# Patient Record
Sex: FEMALE | Race: WHITE | URBAN - METROPOLITAN AREA
[De-identification: names, ages, dates, MRNs, and addresses within clinical notes are randomized per-mention and may not be internally consistent; named-entity substitution may affect disease eponyms.]

---

## 2017-10-09 ENCOUNTER — TRANSFERRED RECORDS (OUTPATIENT)
Dept: HEALTH INFORMATION MANAGEMENT | Facility: CLINIC | Age: 37
End: 2017-10-09

## 2017-10-16 ENCOUNTER — MEDICAL CORRESPONDENCE (OUTPATIENT)
Dept: HEALTH INFORMATION MANAGEMENT | Facility: CLINIC | Age: 37
End: 2017-10-16

## 2017-11-07 ENCOUNTER — MEDICAL CORRESPONDENCE (OUTPATIENT)
Dept: HEALTH INFORMATION MANAGEMENT | Facility: CLINIC | Age: 37
End: 2017-11-07

## 2018-03-20 ENCOUNTER — TELEPHONE (OUTPATIENT)
Dept: DERMATOLOGY | Facility: CLINIC | Age: 38
End: 2018-03-20

## 2018-03-20 NOTE — TELEPHONE ENCOUNTER
Called Irma to remind her of her appointment on 3/27/18. Informed them of parking and to arrive 15 minutes early.   Kim Wilson MA

## 2018-03-27 ENCOUNTER — OFFICE VISIT (OUTPATIENT)
Dept: DERMATOLOGY | Facility: CLINIC | Age: 38
End: 2018-03-27
Payer: COMMERCIAL

## 2018-03-27 VITALS — DIASTOLIC BLOOD PRESSURE: 75 MMHG | HEART RATE: 65 BPM | SYSTOLIC BLOOD PRESSURE: 116 MMHG

## 2018-03-27 DIAGNOSIS — L65.9 LOSS OF HAIR: ICD-10-CM

## 2018-03-27 DIAGNOSIS — Z51.81 MEDICATION MONITORING ENCOUNTER: ICD-10-CM

## 2018-03-27 DIAGNOSIS — L65.9 LOSS OF HAIR: Primary | ICD-10-CM

## 2018-03-27 DIAGNOSIS — L21.9 DERMATITIS, SEBORRHEIC: ICD-10-CM

## 2018-03-27 LAB
BASOPHILS # BLD AUTO: 0 10E9/L (ref 0–0.2)
BASOPHILS NFR BLD AUTO: 0.6 %
DIFFERENTIAL METHOD BLD: NORMAL
EOSINOPHIL # BLD AUTO: 0.1 10E9/L (ref 0–0.7)
EOSINOPHIL NFR BLD AUTO: 2.7 %
ERYTHROCYTE [DISTWIDTH] IN BLOOD BY AUTOMATED COUNT: 12.4 % (ref 10–15)
FERRITIN SERPL-MCNC: 18 NG/ML (ref 12–150)
HCT VFR BLD AUTO: 37.5 % (ref 35–47)
HGB BLD-MCNC: 12.5 G/DL (ref 11.7–15.7)
IMM GRANULOCYTES # BLD: 0 10E9/L (ref 0–0.4)
IMM GRANULOCYTES NFR BLD: 0.2 %
IRON SATN MFR SERPL: 26 % (ref 15–46)
IRON SERPL-MCNC: 83 UG/DL (ref 35–180)
LYMPHOCYTES # BLD AUTO: 2 10E9/L (ref 0.8–5.3)
LYMPHOCYTES NFR BLD AUTO: 39.8 %
MCH RBC QN AUTO: 32.5 PG (ref 26.5–33)
MCHC RBC AUTO-ENTMCNC: 33.3 G/DL (ref 31.5–36.5)
MCV RBC AUTO: 97 FL (ref 78–100)
MONOCYTES # BLD AUTO: 0.4 10E9/L (ref 0–1.3)
MONOCYTES NFR BLD AUTO: 7.5 %
NEUTROPHILS # BLD AUTO: 2.5 10E9/L (ref 1.6–8.3)
NEUTROPHILS NFR BLD AUTO: 49.2 %
NRBC # BLD AUTO: 0 10*3/UL
NRBC BLD AUTO-RTO: 0 /100
PLATELET # BLD AUTO: 203 10E9/L (ref 150–450)
POTASSIUM SERPL-SCNC: 4 MMOL/L (ref 3.4–5.3)
RBC # BLD AUTO: 3.85 10E12/L (ref 3.8–5.2)
TIBC SERPL-MCNC: 317 UG/DL (ref 240–430)
TSH SERPL DL<=0.005 MIU/L-ACNC: 1.77 MU/L (ref 0.4–4)
WBC # BLD AUTO: 5.1 10E9/L (ref 4–11)

## 2018-03-27 RX ORDER — FLUOCINOLONE ACETONIDE 0.11 MG/ML
OIL TOPICAL
Qty: 1 BOTTLE | Refills: 3 | Status: SHIPPED | OUTPATIENT
Start: 2018-03-27

## 2018-03-27 RX ORDER — SPIRONOLACTONE 50 MG/1
TABLET, FILM COATED ORAL
COMMUNITY
Start: 2017-04-21

## 2018-03-27 RX ORDER — KETOCONAZOLE 20 MG/ML
SHAMPOO TOPICAL
Qty: 120 ML | Refills: 11 | Status: SHIPPED | OUTPATIENT
Start: 2018-03-27

## 2018-03-27 NOTE — PROGRESS NOTES
[facility] Dermatology Note      Dermatology Problem List:  1.Nonscarring alopecia - Clinically consistent with female pattern hairloss  -Labs 3/27  -LLLT discussed 3/27    Encounter Date: Mar 27, 2018    CC:  Chief Complaint   Patient presents with     Hair Loss     Irma is visiting for a hair loss consultation.         History of Present Illness:  Ms. Irma Benedict is a 37 year old female who presents in consultation from Anne DORAN at [facility]. Irma reports that she first noticed hair loss 11 years ago during pregnancy and after her first child was born. She reports hair loss with both of her pregnancies (has 2 kids), but otherwise seems to come and go without any identifiable triggers. After her second child was born and the hair loss continued to worsen, she went to [facility] approximately 2.5 years ago at which time she was prescribed rogaine foam and spironolactone. Spironolactone was started at 150mg, then increased to 200mg, then decreased again to 150mg due to gynecomastia. Irma reports that rogaine and spironolactone initially helped, but 7 months after starting noticed increased hair loss again. Hair loss has fluctuated over the last 3 years. Pt reports that right now her hair is the best it has been for some time. She reports occasional scalp pruritus which she feels is due to the dryness from rogaine, but denies scalp pain, burning, or tingling. Brows, lashes, and body hair have been stable.     She shampoos with beauty counter shampoo and conditioner every other day. Also uses a beauty counter hair gel. Has never had any irritation with any products she has used on her scalp.     She reports a history of irregular periods with prolonged periods of bleeding, sometimes will bleed for 3 weeks out of the month. She has had laser hair removal in the past for upper lip and lower abdomen hair. She has never taken hormones to her knowledge and is not currently on  any contraceptive as she has had a tubal ligation.     Labs were done at initial workup showing elevated DHEA-S and testosterone, which have since normalized. Ferritin was found to be 8, which has slightly increased on recheck 10/17 (was 16), but remains low on oral supplementation. A biopsy was done in November 2017 that showed mild nonspecific perivascular and peribulbar inflammation and a telogen shift of 25-30%. Was felt to be consistent with AGA given clinical context.     Past Medical History:   There is no problem list on file for this patient.    History reviewed. No pertinent past medical history.  History reviewed. No pertinent surgical history.    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     Family History:  History of balding in grandfather, no other hair loss in family. Mother has had numerous skin cancers.     Medications:  Current Outpatient Prescriptions   Medication Sig Dispense Refill     spironolactone (ALDACTONE) 50 MG tablet 2 every am and 2 every pm       No Known Allergies      Review of Systems:  -As per HPI  -Skin: As above in HPI. No additional skin concerns.    Physical exam:  Vitals: /75 (BP Location: Left arm, Patient Position: Chair, Cuff Size: Adult Regular)  Pulse 65  GEN: This is a well developed, well-nourished female in no acute distress, in a pleasant mood.    SKIN: Focused examination of the scalp, face, hands, arms was performed.  -Diffuse mild thinning of scalp hair with mild tony tree pattern frontally  -Anterior part width 1.1 to 1.2  -Mild adherent scale and diffuse follicular accentuation, no erythema  -Regrowth layers are robust:    1st: 1-2cm   2nd: 4-5cm   3rd: 8-10cm   4th: 10-12cm  -Robust layer of 2-3cm fibers coming in along frontal hairline   -Brows and lashes WNL  -Fingernails without pitting or ridging  -No other lesions of concern on areas examined.      Impression/Plan:  1. 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 . Discussed rechecking labs given hormonal as well as ferritin abnormalities in the past. If pt is unable to increase ferritin levels with oral supplementation may consider referral to hematology for discussion of iron infusions. Recommend continuing rogaine foam and adding ketoconazole shampoo for perifollicular accentuation and scale. Also recommend starting dermasmoothe oil 1x/w for scalp dryness. LLLT discussed as an option to consider in the future as well.     Labs today: iron studies, ferritin, CBC with diff, DHEA-S, testosterone, vitamin D, zinc, TSH, potassium     Will follow up with lab results and further recommendations, in the meantime continue spironolactone 100mg QD and rogaine 5% foam QD    Start ketoconazole 2% shampoo QOD    LLLT discussed, handout provided     Photographs taken for future reference       Follow-up in 4 months, earlier for new or changing lesions.     Staff Involved:  Scribed by Sarah Nuñez, MS4 for Dr. Headley.      cc: Anne DORAN      I agree with the PFSH and ROS as completed by the Medical Student. The remainder of the encounter was performed by me and scribed by the Medical Student. The scribed note accurately reflects my personal services and the medical decisions made by me.      Oliva Headley MD  Professor and Chair  Department of Dermatology  [facility]

## 2018-03-27 NOTE — NURSING NOTE
Dermatology Rooming Note    Irma Benedict's goals for this visit include:   Chief Complaint   Patient presents with     Hair Loss     Irma is visiting for a hair loss consultation.     Hailey Yip LPN

## 2018-03-27 NOTE — MR AVS SNAPSHOT
After Visit Summary   3/27/2018    Irma Benedict    MRN: [id number]           Patient Information     Date Of Birth          1980        Visit Information        Provider Department      3/27/2018 2:30 PM Oliva Headley MD [facility]        Today's Diagnoses     Loss of hair    -  1    Dermatitis, seborrheic        Medication monitoring encounter           Follow-ups after your visit        Your next 10 appointments already scheduled     Mar 27, 2018  4:00 PM CDT   LAB with  LAB   [facility] ([facility])    [address] [place] [id number] [phone]           Please do not eat 10-12 hours before your appointment if you are coming in fasting for labs on lipids, cholesterol, or glucose (sugar). This does not apply to pregnant women. Water, hot tea and black coffee (with nothing added) are okay. Do not drink other fluids, diet soda or chew gum.              Future tests that were ordered for you today     Open Future Orders        Priority Expected Expires Ordered    Potassium Routine  3/27/2019 3/27/2018    CBC with platelets differential Routine  3/27/2019 3/27/2018    Zinc Routine  3/27/2019 3/27/2018    Testosterone Free and Total Routine  3/27/2019 3/27/2018    DHEA sulfate Routine  3/27/2019 3/27/2018    Ferritin Routine  3/27/2019 3/27/2018    Iron and iron binding capacity Routine  3/27/2019 3/27/2018    Vitamin D Deficiency Routine  3/27/2019 3/27/2018    TSH with free T4 reflex Routine  3/27/2019 3/27/2018            Who to contact     Please call your clinic at [phone] to:    Ask questions about your health    Make or cancel appointments    Discuss your medicines    Learn about your test results    Speak to your doctor            Additional Information About Your Visit        MyChart Information     [employer] is an electronic gateway that provides easy, online access to your medical records. With  MyChart, you can request a clinic appointment, read your test results, renew a prescription or communicate with your care team.     To sign up for [employer] visit the website at [email or web address]   You will be asked to enter the access code listed below, as well as some personal information. Please follow the directions to create your username and password.     Your access code is: [email or web address]  Expires: 2018  3:34 PM     Your access code will  in 90 days. If you need help or a new code, please contact your [facility] or call [phone] for assistance.        Care EveryWhere ID     This is your Care EveryWhere ID. This could be used by other organizations to access your [facility] medical records  [id number]        Your Vitals Were     Pulse                   65            Blood Pressure from Last 3 Encounters:   18 116/75    Weight from Last 3 Encounters:   No data found for Wt                 Today's Medication Changes          These changes are accurate as of 3/27/18  3:34 PM.  If you have any questions, ask your nurse or doctor.               Start taking these medicines.        Dose/Directions    Fluocinolone Acetonide Scalp 0.01 % Oil oil   Commonly known as:  DERMA-SMOOTHE/FS SCALP   Used for:  Dermatitis, seborrheic   Started by:  Oliva Headley MD        Use once weekly. Massage into scalp and leave in overnight, shampoo out in the morning   Quantity:  1 Bottle   Refills:  3       ketoconazole 2 % shampoo   Commonly known as:  NIZORAL   Used for:  Dermatitis, seborrheic   Started by:  Oliva Headley MD        Use 2-3 times per week. Massage into wet hair, leave in 2-3 minutes, then rinse out   Quantity:  120 mL   Refills:  11            Where to get your medicines      These medications were sent to [facility]/pharm[facility] #[id number] - [place] - [address]  [address] [place] [address]     Phone:  [phone]      Fluocinolone Acetonide Scalp 0.01 % Oil oil    ketoconazole 2 % shampoo                Primary Care Provider    None Specified       No primary provider on file.        Equal Access to Services     OVI FERRARO : Hadii aad ku hadmelissaabimael Mckenzie, alinthom alycaronha, grace joannaaliya baldwin, edwin pinedajoselyn vic. So [facility] [phone].    ATENCIÓN: Si habla español, tiene a little disposición servicios gratuitos de asistencia lingüística. Llame al [phone].    We comply with applicable federal civil rights laws and [place] laws. We do not discriminate on the basis of race, color, national origin, age, disability, sex, sexual orientation, or gender identity.            Thank you!     Thank you for choosing [facility]  for your care. Our goal is always to provide you with excellent care. Hearing back from our patients is one way we can continue to improve our services. Please take a few minutes to complete the written survey that you may receive in the mail after your visit with us. Thank you!             Your Updated Medication List - Protect others around you: Learn how to safely use, store and throw away your medicines at www.disposemymeds.org.          This list is accurate as of 3/27/18  3:34 PM.  Always use your most recent med list.                   Brand Name Dispense Instructions for use Diagnosis    Fluocinolone Acetonide Scalp 0.01 % Oil oil    DERMA-SMOOTHE/FS SCALP    1 Bottle    Use once weekly. Massage into scalp and leave in overnight, shampoo out in the morning    Dermatitis, seborrheic       ketoconazole 2 % shampoo    NIZORAL    120 mL    Use 2-3 times per week. Massage into wet hair, leave in 2-3 minutes, then rinse out    Dermatitis, seborrheic       spironolactone 50 MG tablet    ALDACTONE     2 every am and 2 every pm

## 2018-03-27 NOTE — LETTER
3/27/2018       RE: Irma Benedict  [address] [address]     Dear Colleague,    Thank you for referring your patient, Irma Benedict, to the [facility] at [facility]. Please see a copy of my visit note below.    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         History of Present Illness:  Ms. Irma Benedict is a 37 year old female who presents in consultation from Anne DORAN at [facility]. Irma reports that she first noticed hair loss 11 years ago during pregnancy and after her first child was born. She reports hair loss with both of her pregnancies (has 2 kids), but otherwise seems to come and go without any identifiable triggers. After her second child was born and the hair loss continued to worsen, she went to [facility] approximately 2.5 years ago at which time she was prescribed rogaine foam and spironolactone. Spironolactone was started at 150mg, then increased to 200mg, then decreased again to 150mg due to gynecomastia. Irma reports that rogaine and spironolactone initially helped, but 7 months after starting noticed increased hair loss again. Hair loss has fluctuated over the last 3 years. Pt reports that right now her hair is the best it has been for some time. She reports occasional scalp pruritus which she feels is due to the dryness from rogaine, but denies scalp pain, burning, or tingling. Brows, lashes, and body hair have been stable.     She shampoos with beauty counter shampoo and conditioner every other day. Also uses a beauty counter hair gel. Has never had any irritation with any products she has used on her scalp.     She  reports a history of irregular periods with prolonged periods of bleeding, sometimes will bleed for 3 weeks out of the month. She has had laser hair removal in the past for upper lip and lower abdomen hair. She has never taken hormones to her knowledge and is not currently on any contraceptive as she has had a tubal ligation.     Labs were done at initial workup showing elevated DHEA-S and testosterone, which have since normalized. Ferritin was found to be 8, which has slightly increased on recheck 10/17 (was 16), but remains low on oral supplementation. A biopsy was done in November 2017 that showed mild nonspecific perivascular and peribulbar inflammation and a telogen shift of 25-30%. Was felt to be consistent with AGA given clinical context.     Past Medical History:   There is no problem list on file for this patient.    History reviewed. No pertinent past medical history.  History reviewed. No pertinent surgical history.    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     Family History:  History of balding in grandfather, no other hair loss in family. Mother has had numerous skin cancers.     Medications:  Current Outpatient Prescriptions   Medication Sig Dispense Refill     spironolactone (ALDACTONE) 50 MG tablet 2 every am and 2 every pm       No Known Allergies      Review of Systems:  -As per HPI  -Skin: As above in HPI. No additional skin concerns.    Physical exam:  Vitals: /75 (BP Location: Left arm, Patient Position: Chair, Cuff Size: Adult Regular)  Pulse 65  GEN: This is a well developed, well-nourished female in no acute distress, in a pleasant mood.    SKIN: Focused examination of the scalp, face, hands, arms was performed.  -Diffuse mild thinning of scalp hair with mild tony tree pattern frontally  -Anterior part width 1.1 to 1.2  -Mild adherent scale and diffuse follicular accentuation, no erythema  -Regrowth  layers are robust:    1st: 1-2cm   2nd: 4-5cm   3rd: 8-10cm   4th: 10-12cm  -Robust layer of 2-3cm fibers coming in along frontal hairline   -Brows and lashes WNL  -Fingernails without pitting or ridging  -No other lesions of concern on areas examined.     Impression/Plan:  1. 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 . Discussed rechecking labs given hormonal as well as ferritin abnormalities in the past. If pt is unable to increase ferritin levels with oral supplementation may consider referral to hematology for discussion of iron infusions. Recommend continuing rogaine foam and adding ketoconazole shampoo for perifollicular accentuation and scale. Also recommend starting dermasmoothe oil 1x/w for scalp dryness. LLLT discussed as an option to consider in the future as well.     Labs today: iron studies, ferritin, CBC with diff, DHEA-S, testosterone, vitamin D, zinc, TSH, potassium     Will follow up with lab results and further recommendations, in the meantime continue spironolactone 100mg QD and rogaine 5% foam QD    Start ketoconazole 2% shampoo QOD    LLLT discussed, handout provided     Photographs taken for future reference       Follow-up in 4 months, earlier for new or changing lesions.     Staff Involved:  Scribed by Sarah Nuñez, MS4 for Dr. Headley.      cc: Anne DORAN      I agree with the PFSH and ROS as completed by the Medical Student. The remainder of the encounter was performed by me and scribed by the Medical Student. The scribed note accurately reflects my personal services and the medical decisions made by me.      Oliva Headley MD  Professor and Chair  Department of Dermatology  [facility]                              Pictures were placed in Pt's chart today for future reference.            Again, thank you for allowing me to participate in the care of your patient.      Sincerely,    Oliva Vivas  MD [place]

## 2018-03-28 LAB
DEPRECATED CALCIDIOL+CALCIFEROL SERPL-MC: 46 UG/L (ref 20–75)
DHEA-S SERPL-MCNC: 118 UG/DL (ref 35–430)

## 2018-03-29 LAB — ZINC SERPL-MCNC: 58 UG/DL (ref 60–120)

## 2018-04-04 LAB
SHBG SERPL-SCNC: 131 NMOL/L (ref 30–135)
TESTOST FREE SERPL-MCNC: 0.05 NG/DL (ref 0.13–0.92)
TESTOST SERPL-MCNC: 13 NG/DL (ref 8–60)

## 2018-04-08 PROBLEM — L21.9 DERMATITIS, SEBORRHEIC: Status: ACTIVE | Noted: 2018-04-08

## 2018-04-08 PROBLEM — L65.9 LOSS OF HAIR: Status: ACTIVE | Noted: 2018-04-08

## 2020-04-01 ENCOUNTER — RX ONLY (RX ONLY)
Age: 40
End: 2020-04-01

## 2020-05-05 ENCOUNTER — APPOINTMENT (OUTPATIENT)
Age: 40
Setting detail: DERMATOLOGY
End: 2020-05-05

## 2020-05-05 VITALS — RESPIRATION RATE: 16 BRPM | HEIGHT: 65 IN | WEIGHT: 120 LBS

## 2020-05-05 DIAGNOSIS — L65.9 NONSCARRING HAIR LOSS, UNSPECIFIED: ICD-10-CM

## 2020-05-05 PROCEDURE — OTHER COUNSELING: OTHER

## 2020-05-05 PROCEDURE — 99213 OFFICE O/P EST LOW 20 MIN: CPT

## 2020-05-05 PROCEDURE — OTHER PRESCRIPTION: OTHER

## 2020-05-05 ASSESSMENT — LOCATION SIMPLE DESCRIPTION DERM: LOCATION SIMPLE: POSTERIOR SCALP

## 2020-05-05 ASSESSMENT — LOCATION DETAILED DESCRIPTION DERM: LOCATION DETAILED: POSTERIOR MID-PARIETAL SCALP

## 2020-05-05 ASSESSMENT — LOCATION ZONE DERM: LOCATION ZONE: SCALP

## 2020-05-05 NOTE — PROCEDURE: COUNSELING
Detail Level: Zone
Patient Specific Counseling (Will Not Stick From Patient To Patient): Pt is happy with treatment and would like refills. No sides effects per pt. Continue taking otc topical rogaine

## 2020-05-05 NOTE — HPI: HAIR LOSS
How Severe Is Your Hair Loss?: moderate
Additional History: Doing well with no side effects from her spironolactone, no muscle aches, no heart palpitations

## 2021-08-31 ENCOUNTER — APPOINTMENT (OUTPATIENT)
Dept: URBAN - METROPOLITAN AREA CLINIC 260 | Age: 41
Setting detail: DERMATOLOGY
End: 2021-09-01

## 2021-08-31 VITALS — RESPIRATION RATE: 17 BRPM | WEIGHT: 120 LBS | HEIGHT: 65 IN

## 2021-08-31 DIAGNOSIS — L65.9 NONSCARRING HAIR LOSS, UNSPECIFIED: ICD-10-CM

## 2021-08-31 PROCEDURE — OTHER PRESCRIPTION MEDICATION MANAGEMENT: OTHER

## 2021-08-31 PROCEDURE — OTHER PRESCRIPTION: OTHER

## 2021-08-31 PROCEDURE — 99214 OFFICE O/P EST MOD 30 MIN: CPT

## 2021-08-31 RX ORDER — FINASTERIDE 5 MG/1
2.5MG TABLET, FILM COATED ORAL QD
Qty: 45 | Refills: 1 | Status: ERX | COMMUNITY
Start: 2021-08-31

## 2021-08-31 ASSESSMENT — LOCATION SIMPLE DESCRIPTION DERM: LOCATION SIMPLE: LEFT SCALP

## 2021-08-31 ASSESSMENT — LOCATION ZONE DERM: LOCATION ZONE: SCALP

## 2021-08-31 ASSESSMENT — LOCATION DETAILED DESCRIPTION DERM: LOCATION DETAILED: LEFT MEDIAL FRONTAL SCALP

## 2021-08-31 NOTE — HPI: HAIR LOSS
How Did The Hair Loss Occur?: gradual in onset
Additional History: Just had prp injections last week, this is the first tmt out of 3\\nShe’s also tried laser hair lawson for a couple years with no improvement \\nShe’s doing well on spironolactone with no side effects. No heart palpitations and no muscle cramps

## 2022-01-25 ENCOUNTER — APPOINTMENT (OUTPATIENT)
Dept: URBAN - METROPOLITAN AREA CLINIC 260 | Age: 42
Setting detail: DERMATOLOGY
End: 2022-01-26

## 2022-01-25 VITALS — HEIGHT: 64 IN | WEIGHT: 120 LBS | RESPIRATION RATE: 16 BRPM

## 2022-01-25 DIAGNOSIS — L64.8 OTHER ANDROGENIC ALOPECIA: ICD-10-CM

## 2022-01-25 PROCEDURE — OTHER PRESCRIPTION: OTHER

## 2022-01-25 PROCEDURE — OTHER ADDITIONAL NOTES: OTHER

## 2022-01-25 PROCEDURE — OTHER PRESCRIPTION MEDICATION MANAGEMENT: OTHER

## 2022-01-25 PROCEDURE — OTHER MIPS QUALITY: OTHER

## 2022-01-25 PROCEDURE — OTHER COUNSELING: OTHER

## 2022-01-25 PROCEDURE — 99213 OFFICE O/P EST LOW 20 MIN: CPT

## 2022-01-25 RX ORDER — FINASTERIDE 5 MG/1
5MG TABLET, FILM COATED ORAL QD
Qty: 90 | Refills: 3 | Status: ERX

## 2022-01-25 ASSESSMENT — LOCATION SIMPLE DESCRIPTION DERM
LOCATION SIMPLE: LEFT SCALP
LOCATION SIMPLE: SCALP

## 2022-01-25 ASSESSMENT — LOCATION ZONE DERM: LOCATION ZONE: SCALP

## 2022-01-25 ASSESSMENT — LOCATION DETAILED DESCRIPTION DERM
LOCATION DETAILED: RIGHT SUPERIOR PARIETAL SCALP
LOCATION DETAILED: LEFT MEDIAL FRONTAL SCALP

## 2022-01-25 NOTE — HPI: ANDROGENIC ALOPECIA
Is This A New Presentation, Or A Follow-Up?: Follow Up Androgenic Alopecia
Additional History: She doesn’t feel that this is helping. She’s taking 2.5 milligrams of finasteride every day

## 2022-01-25 NOTE — PROCEDURE: ADDITIONAL NOTES
Detail Level: Detailed
Additional Notes: She has not had her flu shot
Render Risk Assessment In Note?: no

## 2022-01-25 NOTE — PROCEDURE: PRESCRIPTION MEDICATION MANAGEMENT
Plan: Follow up in 1 year or sooner if not improved.\\nWe compared photos of her hair today versus six months ago and have noticed quite a bit of improvement. To help improve her scalp hair even more we will up the dose one last time. We discussed that this is the highest she can go. She denies any side effects from finasteride at this point so we will try the 5 mg pill
Continue Regimen: Laser hair comb M W F with the laser max hair comb
Initiate Treatment: Increase Finasteride 5mg QD
Render In Strict Bullet Format?: No
Detail Level: Zone

## 2022-01-25 NOTE — PROCEDURE: MIPS QUALITY
Quality 110: Preventive Care And Screening: Influenza Immunization: Influenza Immunization not Administered for Documented Reasons.
Quality 130: Documentation Of Current Medications In The Medical Record: Current Medications Documented
Quality 431: Preventive Care And Screening: Unhealthy Alcohol Use - Screening: Patient not identified as an unhealthy alcohol user when screened for unhealthy alcohol use using a systematic screening method
Quality 226: Preventive Care And Screening: Tobacco Use: Screening And Cessation Intervention: Patient screened for tobacco use and is an ex/non-smoker
Detail Level: Detailed

## 2022-09-28 ENCOUNTER — APPOINTMENT (OUTPATIENT)
Dept: URBAN - METROPOLITAN AREA CLINIC 260 | Age: 42
Setting detail: DERMATOLOGY
End: 2022-09-28

## 2022-09-28 VITALS — HEIGHT: 65 IN | WEIGHT: 120 LBS

## 2022-09-28 DIAGNOSIS — L64.8 OTHER ANDROGENIC ALOPECIA: ICD-10-CM

## 2022-09-28 PROCEDURE — 99214 OFFICE O/P EST MOD 30 MIN: CPT

## 2022-09-28 PROCEDURE — OTHER PRESCRIPTION: OTHER

## 2022-09-28 PROCEDURE — OTHER MIPS QUALITY: OTHER

## 2022-09-28 PROCEDURE — OTHER PHOTO-DOCUMENTATION: OTHER

## 2022-09-28 PROCEDURE — OTHER PRESCRIPTION MEDICATION MANAGEMENT: OTHER

## 2022-09-28 PROCEDURE — OTHER COUNSELING: OTHER

## 2022-09-28 PROCEDURE — OTHER ADDITIONAL NOTES: OTHER

## 2022-09-28 RX ORDER — MINOXIDIL 2.5 MG/1
2.5 MG TABLET ORAL DAILY
Qty: 30 | Refills: 3 | Status: ERX | COMMUNITY
Start: 2022-09-28

## 2022-09-28 ASSESSMENT — LOCATION ZONE DERM: LOCATION ZONE: SCALP

## 2022-09-28 ASSESSMENT — LOCATION DETAILED DESCRIPTION DERM: LOCATION DETAILED: RIGHT SUPERIOR PARIETAL SCALP

## 2022-09-28 ASSESSMENT — LOCATION SIMPLE DESCRIPTION DERM: LOCATION SIMPLE: SCALP

## 2022-09-28 NOTE — PROCEDURE: PRESCRIPTION MEDICATION MANAGEMENT
Discontinue Regimen: Finasteride
Render In Strict Bullet Format?: No
Continue Regimen: Laser hair comb MWF
Detail Level: Zone
Plan: Recheck hair loss in 3 months. We will start oral minoxidil.disc side effects including dizziness upon standing and pericardial effusion. Pt verbalized understanding and will keep an eye out for symptoms of pericardial effusion, tachycardia, nausea, vomiting. Pt had a poor experience with PRP injections that she had done last year. She experienced increased hair loss so much so that she didn’t go back to the 3rd tmt
Initiate Treatment: minoxidil 2.5 mg tablet Daily

## 2022-09-28 NOTE — HPI: ANDROGENIC ALOPECIA
Is This A New Presentation, Or A Follow-Up?: Follow Up Androgenic Alopecia
How Did The Hair Loss Occur?: gradual in onset
How Severe Is Your Hair Loss?: severe
Additional History: She noticed that she was still experiencing hair loss on the 5mg dose of finasteride so she went back down to 2.5mg. At this point she feels her hair is worsening and she is interested in minoxidil

## 2022-09-28 NOTE — PROCEDURE: MIPS QUALITY
Detail Level: Detailed
Quality 130: Documentation Of Current Medications In The Medical Record: Current Medications Documented
Quality 431: Preventive Care And Screening: Unhealthy Alcohol Use - Screening: Patient not identified as an unhealthy alcohol user when screened for unhealthy alcohol use using a systematic screening method
Quality 226: Preventive Care And Screening: Tobacco Use: Screening And Cessation Intervention: Patient screened for tobacco use and is an ex/non-smoker
Quality 110: Preventive Care And Screening: Influenza Immunization: Influenza Immunization not Administered for Documented Reasons.

## 2022-12-29 ENCOUNTER — APPOINTMENT (OUTPATIENT)
Dept: URBAN - METROPOLITAN AREA CLINIC 260 | Age: 42
Setting detail: DERMATOLOGY
End: 2022-12-30

## 2022-12-29 VITALS — WEIGHT: 125 LBS | HEIGHT: 65 IN

## 2022-12-29 DIAGNOSIS — L64.8 OTHER ANDROGENIC ALOPECIA: ICD-10-CM

## 2022-12-29 PROCEDURE — 99214 OFFICE O/P EST MOD 30 MIN: CPT

## 2022-12-29 PROCEDURE — OTHER PRESCRIPTION MEDICATION MANAGEMENT: OTHER

## 2022-12-29 PROCEDURE — OTHER ADDITIONAL NOTES: OTHER

## 2022-12-29 PROCEDURE — OTHER PHOTO-DOCUMENTATION: OTHER

## 2022-12-29 PROCEDURE — OTHER MIPS QUALITY: OTHER

## 2022-12-29 PROCEDURE — OTHER COUNSELING: OTHER

## 2022-12-29 PROCEDURE — OTHER PRESCRIPTION: OTHER

## 2022-12-29 RX ORDER — MINOXIDIL 2.5 MG/1
2.5 MG TABLET ORAL DAILY
Qty: 90 | Refills: 1 | Status: ERX

## 2022-12-29 ASSESSMENT — LOCATION SIMPLE DESCRIPTION DERM: LOCATION SIMPLE: SCALP

## 2022-12-29 ASSESSMENT — LOCATION DETAILED DESCRIPTION DERM: LOCATION DETAILED: RIGHT SUPERIOR PARIETAL SCALP

## 2022-12-29 ASSESSMENT — LOCATION ZONE DERM: LOCATION ZONE: SCALP

## 2022-12-29 NOTE — PROCEDURE: ADDITIONAL NOTES
Detail Level: Detailed
Render Risk Assessment In Note?: no
Additional Notes: She has not had her flu shot

## 2022-12-29 NOTE — PROCEDURE: PRESCRIPTION MEDICATION MANAGEMENT
Plan: Recheck hair loss in 6 months.
Render In Strict Bullet Format?: No
Continue Regimen: minoxidil 2.5 mg tablet Daily, Laser hair comb MWF
Detail Level: Zone

## 2022-12-29 NOTE — HPI: HAIR LOSS
How Did The Hair Loss Occur?: gradual in onset
How Severe Is Your Hair Loss?: moderate
Additional History: She’s doing well on this however has noticed more hair growth in other places like eyebrows, face, upper lip

## 2022-12-29 NOTE — PROCEDURE: MIPS QUALITY
Quality 226: Preventive Care And Screening: Tobacco Use: Screening And Cessation Intervention: Patient screened for tobacco use and is an ex/non-smoker
Quality 431: Preventive Care And Screening: Unhealthy Alcohol Use - Screening: Patient not identified as an unhealthy alcohol user when screened for unhealthy alcohol use using a systematic screening method
Quality 130: Documentation Of Current Medications In The Medical Record: Current Medications Documented
Detail Level: Detailed
Quality 110: Preventive Care And Screening: Influenza Immunization: Influenza Immunization not Administered for Documented Reasons.

## 2023-07-28 RX ORDER — MINOXIDIL 2.5 MG/1
2.5 MG TABLET ORAL DAILY
Qty: 90 | Refills: 1 | Status: ERX

## 2023-08-23 ENCOUNTER — APPOINTMENT (OUTPATIENT)
Dept: URBAN - METROPOLITAN AREA CLINIC 260 | Age: 43
Setting detail: DERMATOLOGY
End: 2023-08-24

## 2023-08-23 VITALS — WEIGHT: 125 LBS | HEIGHT: 55 IN

## 2023-08-23 DIAGNOSIS — L64.8 OTHER ANDROGENIC ALOPECIA: ICD-10-CM

## 2023-08-23 PROCEDURE — OTHER COUNSELING: OTHER

## 2023-08-23 PROCEDURE — OTHER MIPS QUALITY: OTHER

## 2023-08-23 PROCEDURE — OTHER ADDITIONAL NOTES: OTHER

## 2023-08-23 PROCEDURE — OTHER PHOTO-DOCUMENTATION: OTHER

## 2023-08-23 PROCEDURE — 99213 OFFICE O/P EST LOW 20 MIN: CPT

## 2023-08-23 PROCEDURE — OTHER PRESCRIPTION: OTHER

## 2023-08-23 PROCEDURE — OTHER PRESCRIPTION MEDICATION MANAGEMENT: OTHER

## 2023-08-23 RX ORDER — MINOXIDIL 2.5 MG/1
2.5 MG TABLET ORAL DAILY
Qty: 90 | Refills: 1 | Status: ERX | COMMUNITY
Start: 2023-08-23

## 2023-08-23 ASSESSMENT — LOCATION SIMPLE DESCRIPTION DERM: LOCATION SIMPLE: SCALP

## 2023-08-23 ASSESSMENT — LOCATION ZONE DERM: LOCATION ZONE: SCALP

## 2023-08-23 ASSESSMENT — LOCATION DETAILED DESCRIPTION DERM: LOCATION DETAILED: RIGHT SUPERIOR PARIETAL SCALP

## 2023-08-23 NOTE — PROCEDURE: PRESCRIPTION MEDICATION MANAGEMENT
Plan: Recommend rosemary oil to the scalp M,W,F at night time and OTC vivascal. Recheck hair loss in 6 months.\\nShe will continue minoxidil at this time as she prefers this side effect panel compared to the finasteride panel and will follow up in 6 mo
Render In Strict Bullet Format?: No
Continue Regimen: minoxidil 2.5 mg tablet Daily
Detail Level: Zone

## 2023-08-23 NOTE — PROCEDURE: PHOTO-DOCUMENTATION
Photo Preface (Leave Blank If You Do Not Want): Photographs were obtained today to monitor
Details (Free Text): Hair growth
Detail Level: Detailed

## 2024-04-11 ENCOUNTER — APPOINTMENT (OUTPATIENT)
Dept: URBAN - METROPOLITAN AREA CLINIC 260 | Age: 44
Setting detail: DERMATOLOGY
End: 2024-04-11

## 2024-04-11 VITALS — HEIGHT: 65 IN | WEIGHT: 120 LBS

## 2024-04-11 DIAGNOSIS — L64.8 OTHER ANDROGENIC ALOPECIA: ICD-10-CM

## 2024-04-11 PROCEDURE — OTHER PRESCRIPTION MEDICATION MANAGEMENT: OTHER

## 2024-04-11 PROCEDURE — OTHER MIPS QUALITY: OTHER

## 2024-04-11 PROCEDURE — 99213 OFFICE O/P EST LOW 20 MIN: CPT

## 2024-04-11 PROCEDURE — OTHER COUNSELING: OTHER

## 2024-04-11 PROCEDURE — OTHER PHOTO-DOCUMENTATION: OTHER

## 2024-04-11 PROCEDURE — OTHER PRESCRIPTION: OTHER

## 2024-04-11 RX ORDER — MINOXIDIL 2.5 MG/1
2.5 MG TABLET ORAL DAILY
Qty: 90 | Refills: 3 | Status: ERX

## 2024-04-11 ASSESSMENT — LOCATION ZONE DERM: LOCATION ZONE: SCALP

## 2024-04-11 ASSESSMENT — LOCATION DETAILED DESCRIPTION DERM: LOCATION DETAILED: RIGHT SUPERIOR PARIETAL SCALP

## 2024-04-11 ASSESSMENT — LOCATION SIMPLE DESCRIPTION DERM: LOCATION SIMPLE: SCALP

## 2024-04-11 NOTE — PROCEDURE: PRESCRIPTION MEDICATION MANAGEMENT
Plan: Recheck hair loss in 6 months.
Render In Strict Bullet Format?: No
Continue Regimen: minoxidil 2.5 mg tablet Daily
Detail Level: Zone

## 2024-10-14 ENCOUNTER — APPOINTMENT (OUTPATIENT)
Dept: URBAN - METROPOLITAN AREA CLINIC 260 | Age: 44
Setting detail: DERMATOLOGY
End: 2024-10-14

## 2024-10-14 VITALS — HEIGHT: 65 IN | WEIGHT: 120 LBS

## 2024-10-14 DIAGNOSIS — L64.8 OTHER ANDROGENIC ALOPECIA: ICD-10-CM

## 2024-10-14 PROCEDURE — OTHER PRESCRIPTION MEDICATION MANAGEMENT: OTHER

## 2024-10-14 PROCEDURE — 99213 OFFICE O/P EST LOW 20 MIN: CPT

## 2024-10-14 PROCEDURE — OTHER COUNSELING: OTHER

## 2024-10-14 PROCEDURE — OTHER MIPS QUALITY: OTHER

## 2024-10-14 PROCEDURE — OTHER PHOTO-DOCUMENTATION: OTHER

## 2024-10-14 ASSESSMENT — LOCATION SIMPLE DESCRIPTION DERM: LOCATION SIMPLE: SCALP

## 2024-10-14 ASSESSMENT — LOCATION DETAILED DESCRIPTION DERM: LOCATION DETAILED: RIGHT SUPERIOR PARIETAL SCALP

## 2024-10-14 ASSESSMENT — LOCATION ZONE DERM: LOCATION ZONE: SCALP

## 2024-10-14 NOTE — PROCEDURE: PRESCRIPTION MEDICATION MANAGEMENT
Plan: Follow up in 1 year or sooner if needed.
Render In Strict Bullet Format?: No
Continue Regimen: minoxidil 2.5 mg tablet Daily- no refills needed today. Patient will contact pharmacy when she needs more
Detail Level: Zone

## 2025-07-11 NOTE — HPI: HAIR LOSS
Additional History: Doing ok with the minoxidil. She felt that it helped more in the beginning but has since plateaued. She denies no side effects otherwise none